# Patient Record
Sex: FEMALE | Race: WHITE
[De-identification: names, ages, dates, MRNs, and addresses within clinical notes are randomized per-mention and may not be internally consistent; named-entity substitution may affect disease eponyms.]

---

## 2020-11-04 ENCOUNTER — HOSPITAL ENCOUNTER (OUTPATIENT)
Dept: HOSPITAL 95 - LAB SHORT | Age: 60
Discharge: HOME | End: 2020-11-04
Payer: COMMERCIAL

## 2020-11-04 DIAGNOSIS — D21.0: Primary | ICD-10-CM

## 2022-06-15 ENCOUNTER — HOSPITAL ENCOUNTER (OUTPATIENT)
Dept: HOSPITAL 95 - ORSCSDS | Age: 62
Discharge: HOME | End: 2022-06-15
Attending: INTERNAL MEDICINE
Payer: COMMERCIAL

## 2022-06-15 VITALS — WEIGHT: 124.78 LBS | HEIGHT: 67 IN | BODY MASS INDEX: 19.58 KG/M2

## 2022-06-15 DIAGNOSIS — J45.909: ICD-10-CM

## 2022-06-15 DIAGNOSIS — K22.70: Primary | ICD-10-CM

## 2022-06-15 DIAGNOSIS — F32.A: ICD-10-CM

## 2022-06-15 DIAGNOSIS — Z79.899: ICD-10-CM

## 2022-06-15 PROCEDURE — 0DB58ZX EXCISION OF ESOPHAGUS, VIA NATURAL OR ARTIFICIAL OPENING ENDOSCOPIC, DIAGNOSTIC: ICD-10-PCS | Performed by: INTERNAL MEDICINE

## 2022-06-15 NOTE — NUR
06/15/22 1646 DWAINE YE
LATE ENTRY; DURING PROCEDURE SATS DOWN TO 90% ON 3L O2; PER DR BURGESS ORDER O2 UP TO 7L VIA NASAL CANULA, CHIN LIFT/JAW THRUST
PERFORMED. O2 SATS CAME UP TO 97% ON 7L O2 VIA NC.